# Patient Record
Sex: FEMALE | Race: WHITE
[De-identification: names, ages, dates, MRNs, and addresses within clinical notes are randomized per-mention and may not be internally consistent; named-entity substitution may affect disease eponyms.]

---

## 2017-12-02 ENCOUNTER — HOSPITAL ENCOUNTER (EMERGENCY)
Dept: HOSPITAL 62 - ER | Age: 72
Discharge: HOME | End: 2017-12-02
Payer: MEDICARE

## 2017-12-02 VITALS — DIASTOLIC BLOOD PRESSURE: 74 MMHG | SYSTOLIC BLOOD PRESSURE: 136 MMHG

## 2017-12-02 DIAGNOSIS — R09.81: ICD-10-CM

## 2017-12-02 DIAGNOSIS — R09.89: ICD-10-CM

## 2017-12-02 DIAGNOSIS — R05: Primary | ICD-10-CM

## 2017-12-02 DIAGNOSIS — R49.0: ICD-10-CM

## 2017-12-02 LAB
ALBUMIN SERPL-MCNC: 4.4 G/DL (ref 3.5–5)
ALP SERPL-CCNC: 77 U/L (ref 38–126)
ALT SERPL-CCNC: 39 U/L (ref 9–52)
ANION GAP SERPL CALC-SCNC: 15 MMOL/L (ref 5–19)
AST SERPL-CCNC: 25 U/L (ref 14–36)
BASOPHILS # BLD AUTO: 0 10^3/UL (ref 0–0.2)
BASOPHILS NFR BLD AUTO: 0.5 % (ref 0–2)
BILIRUB DIRECT SERPL-MCNC: 0.3 MG/DL (ref 0–0.4)
BILIRUB SERPL-MCNC: 0.4 MG/DL (ref 0.2–1.3)
BUN SERPL-MCNC: 12 MG/DL (ref 7–20)
CALCIUM: 9.5 MG/DL (ref 8.4–10.2)
CHLORIDE SERPL-SCNC: 104 MMOL/L (ref 98–107)
CO2 SERPL-SCNC: 23 MMOL/L (ref 22–30)
CREAT SERPL-MCNC: 0.91 MG/DL (ref 0.52–1.25)
EOSINOPHIL # BLD AUTO: 0.1 10^3/UL (ref 0–0.6)
EOSINOPHIL NFR BLD AUTO: 2.9 % (ref 0–6)
ERYTHROCYTE [DISTWIDTH] IN BLOOD BY AUTOMATED COUNT: 12.9 % (ref 11.5–14)
GLUCOSE SERPL-MCNC: 94 MG/DL (ref 75–110)
HCT VFR BLD CALC: 44.1 % (ref 36–47)
HGB BLD-MCNC: 14.9 G/DL (ref 12–15.5)
HGB HCT DIFFERENCE: 0.6
LYMPHOCYTES # BLD AUTO: 1.8 10^3/UL (ref 0.5–4.7)
LYMPHOCYTES NFR BLD AUTO: 39.9 % (ref 13–45)
MCH RBC QN AUTO: 33.1 PG (ref 27–33.4)
MCHC RBC AUTO-ENTMCNC: 33.9 G/DL (ref 32–36)
MCV RBC AUTO: 98 FL (ref 80–97)
MONOCYTES # BLD AUTO: 0.6 10^3/UL (ref 0.1–1.4)
MONOCYTES NFR BLD AUTO: 13 % (ref 3–13)
NEUTROPHILS # BLD AUTO: 2 10^3/UL (ref 1.7–8.2)
NEUTS SEG NFR BLD AUTO: 43.7 % (ref 42–78)
POTASSIUM SERPL-SCNC: 4.7 MMOL/L (ref 3.6–5)
PROT SERPL-MCNC: 7.4 G/DL (ref 6.3–8.2)
RBC # BLD AUTO: 4.52 10^6/UL (ref 3.72–5.28)
SODIUM SERPL-SCNC: 141.7 MMOL/L (ref 137–145)
WBC # BLD AUTO: 4.5 10^3/UL (ref 4–10.5)

## 2017-12-02 PROCEDURE — 71020: CPT

## 2017-12-02 PROCEDURE — 99283 EMERGENCY DEPT VISIT LOW MDM: CPT

## 2017-12-02 PROCEDURE — 87804 INFLUENZA ASSAY W/OPTIC: CPT

## 2017-12-02 PROCEDURE — 36415 COLL VENOUS BLD VENIPUNCTURE: CPT

## 2017-12-02 PROCEDURE — 80053 COMPREHEN METABOLIC PANEL: CPT

## 2017-12-02 PROCEDURE — 85025 COMPLETE CBC W/AUTO DIFF WBC: CPT

## 2017-12-02 NOTE — ER DOCUMENT REPORT
ED Medical Screen (RME)





- General


Chief Complaint: Productive Cough


Stated Complaint: COUGH


Time Seen by Provider: 12/02/17 16:38


Mode of Arrival: Ambulatory


Information source: Patient


TRAVEL OUTSIDE OF THE [place] IN LAST 30 DAYS: No





- HPI


Patient complains to provider of: cough


Onset: Other - pt states she has had recurrent productive cough for the past 

few weeks despite being placed on abx and steroids by her PCP.  Has had low 

grade fever as well





- Related Data


Allergies/Adverse Reactions: 


 





No Known Drug Allergies Allergy (Verified 12/02/17 15:59)


 











Past Medical History





- Social History


Chew tobacco use (# tins/day): No


Frequency of alcohol use: None


Drug Abuse: None





- Past Medical History


Cardiac Medical History: 


   Denies: Hx Coronary Artery Disease, Hx Heart Attack, Hx Hypertension


Pulmonary Medical History: 


   Denies: Hx Asthma, Hx Bronchitis, Hx COPD, Hx Pneumonia


Neurological Medical History: Denies: Hx Cerebrovascular Accident, Hx Seizures


Renal/ Medical History: Denies: Hx Peritoneal Dialysis


GI Medical History: Denies: Hx Hepatitis, Hx Hiatal Hernia, Hx Ulcer


Musculoskeltal Medical History: Reports Hx Arthritis


Infectious Medical History: Denies: Hx Hepatitis


Past Surgical History: Reports: Hx Hysterectomy.  Denies: Hx Mastectomy, Hx 

Open Heart Surgery, Hx Pacemaker





- Immunizations


Hx Diphtheria, Pertussis, Tetanus Vaccination: Yes





Physical Exam





- Vital signs


Vitals: 





 











Temp Pulse Resp BP Pulse Ox


 


 98.2 F   81   18   139/76 H  97 


 


 12/02/17 16:19  12/02/17 16:19  12/02/17 16:19  12/02/17 16:19  12/02/17 16:19














Course





- Vital Signs


Vital signs: 





 











Temp Pulse Resp BP Pulse Ox


 


 98.2 F   81   18   139/76 H  97 


 


 12/02/17 16:19  12/02/17 16:19  12/02/17 16:19  12/02/17 16:19  12/02/17 16:19

## 2017-12-02 NOTE — ER DOCUMENT REPORT
ED General





- General


Chief Complaint: Productive Cough


Stated Complaint: COUGH


Time Seen by Provider: 12/02/17 16:38


Mode of Arrival: Ambulatory


Notes: 


Patient is a 72-year-old female comes emergency department for chief complaint 

of productive cough with yellow sputum production for the past several days, 

she also does have some sinus congestion and voice hoarseness.   states 

that she sounded like she was wheezing at night.  Patient does not smoke, has 

no history of COPD or asthma, wife states that she has had recurrent episodes 

of the same since August.  She was treated with azithromycin twice before and 

had "shots of steroids".  Patient is typically very healthy, she takes no daily 

medications, only past medical history reported is arthritis and hysterectomy.





TRAVEL OUTSIDE OF THE [place] IN LAST 30 DAYS: No





- Related Data


Allergies/Adverse Reactions: 


 





No Known Drug Allergies Allergy (Verified 12/02/17 15:59)


 











Past Medical History





- General


Information source: Patient





- Social History


Smoking Status: Never Smoker


Chew tobacco use (# tins/day): No


Frequency of alcohol use: None


Drug Abuse: None


Family History: Reviewed & Not Pertinent


Patient has suicidal ideation: No


Patient has homicidal ideation: No





- Past Medical History


Cardiac Medical History: 


   Denies: Hx Coronary Artery Disease, Hx Heart Attack, Hx Hypertension


Pulmonary Medical History: 


   Denies: Hx Asthma, Hx Bronchitis, Hx COPD, Hx Pneumonia


Neurological Medical History: Denies: Hx Cerebrovascular Accident, Hx Seizures


Renal/ Medical History: Denies: Hx Peritoneal Dialysis


GI Medical History: Denies: Hx Hepatitis, Hx Hiatal Hernia, Hx Ulcer


Musculoskeltal Medical History: Reports Hx Arthritis


Infectious Medical History: Denies: Hx Hepatitis


Past Surgical History: Reports: Hx Hysterectomy.  Denies: Hx Mastectomy, Hx 

Open Heart Surgery, Hx Pacemaker





- Immunizations


Hx Diphtheria, Pertussis, Tetanus Vaccination: Yes





Review of Systems





- Review of Systems


Constitutional: See HPI


EENT: No symptoms reported


Cardiovascular: No symptoms reported


Respiratory: See HPI


Gastrointestinal: No symptoms reported


Genitourinary: No symptoms reported


Female Genitourinary: No symptoms reported


Musculoskeletal: No symptoms reported


Skin: No symptoms reported


Hematologic/Lymphatic: No symptoms reported


Neurological/Psychological: No symptoms reported





Physical Exam





- Vital signs


Vitals: 


 











Temp Pulse Resp BP Pulse Ox


 


 98.2 F   81   18   139/76 H  97 


 


 12/02/17 16:19  12/02/17 16:19  12/02/17 16:19  12/02/17 16:19  12/02/17 16:19











Interpretation: Normal





- General


General appearance: Appears well, Alert


In distress: None





- HEENT


Head: Normocephalic, Atraumatic


Eyes: Normal


Pupils: PERRL





- Respiratory


Respiratory status: No respiratory distress.  No: Respiratory distress, 

Retractions, Tachypnea


Chest status: Nontender


Breath sounds: Nonproductive cough, Other - Scattered coarse lung sounds in the 

right lung only, no wheezing, rales, rhonchi, normal lung auscultation otherwise


Chest palpation: Normal





- Cardiovascular


Rhythm: Regular.  No: Tachycardia


Heart sounds: Normal auscultation, S1 appreciated, S2 appreciated


Murmur: No





- Abdominal


Inspection: Normal


Distension: No distension


Bowel sounds: Normal


Tenderness: Nontender


Organomegaly: No organomegaly





- Back


Back: Normal, Nontender





- Extremities


General upper extremity: Normal inspection, Nontender, Normal strength, Normal 

temperature


General lower extremity: Normal inspection, Nontender, Normal strength, Normal 

temperature





- Neurological


Neuro grossly intact: Yes


Cognition: Normal


Orientation: AAOx4


David Coma Scale Eye Opening: Spontaneous


David Coma Scale Verbal: Oriented


Shania Coma Scale Motor: Obeys Commands


Shania Coma Scale Total: 15


Speech: Normal


Motor strength normal: LUE, RUE, LLE, RLE


Sensory: Normal





- Psychological


Associated symptoms: Normal affect, Normal mood





- Skin


Skin Temperature: Warm


Skin Moisture: Dry


Skin Color: Normal





Course





- Re-evaluation


Re-evalutation: 


Patient does have some coarse breath sounds in the right lung compared to left, 

clear lungs otherwise, no hypoxia, tachypnea, or signs of distress.  Chest x-

ray unremarkable, CBC, chemistry, influenza are unremarkable.  Patient has a 

productive cough.  She most likely has bronchitis, however because of slightly 

abnormal lung sounds to be covered with both prednisone and doxycycline.  This 

was decided after discussion with patient.  Patient is to follow-up closely 

with her primary provider, she is to return immediately if she develops any 

concerning or worsening symptoms including difficulty breathing, spiking fever, 

etc.  Patient and  state understanding and agreement with plan.





- Vital Signs


Vital signs: 


 











Temp Pulse Resp BP Pulse Ox


 


 98.2 F   76   18   136/74 H  100 


 


 12/02/17 16:19  12/02/17 21:00  12/02/17 21:00  12/02/17 21:00  12/02/17 21:00














- Laboratory


Result Diagrams: 


 12/02/17 17:08





 12/02/17 17:08


Laboratory results interpreted by me: 


 











  12/02/17





  17:08


 


MCV  98 H














Discharge





- Discharge


Clinical Impression: 


 Productive cough, Sinus congestion





Condition: Stable


Disposition: HOME, SELF-CARE


Additional Instructions: 


Your symptoms and examination are consistent with bronchitis.  Chest x-ray, 

influenza test, and lab work did not show any positives or concerning 

abnormalities.  





Recommendation is to take the doxycycline and prednisone as prescribed, take 

the Tessalon if needed for cough, drink plenty of fluids.  I also recommend 

considering over-the-counter antiallergy medication on a daily basis such as 

Claritin or Zyrtec because of suspected allergic component.  





Follow-up with primary care.  Return the emergency department for any 

concerning or worsening symptoms including difficulty breathing, spiking fever, 

or any other concerning symptoms.


Prescriptions: 


Benzonatate [Tessalon Perle 100 mg Capsule] 100 mg PO Q8HP PRN #20 cap


 PRN Reason: 


Doxycycline Hyclate 100 mg PO BID #14 capsule


Prednisone [Deltasone 10 mg Tablet] 10 mg PO ASDIR PRN #21 tablet


 PRN Reason: 


Referrals: 


ROBERTA THAKKAR FNP-C [Primary Care Provider] - Follow up in 1 week

## 2017-12-02 NOTE — RADIOLOGY REPORT (SQ)
EXAM DESCRIPTION:  CHEST PA/LAT



COMPLETED DATE/TIME:  12/2/2017 5:26 pm



REASON FOR STUDY:  productive cough



COMPARISON:  2/12/2013



EXAM PARAMETERS:  NUMBER OF VIEWS: two views

TECHNIQUE: Digital Frontal and Lateral radiographic views of the chest acquired.

RADIATION DOSE: NA

LIMITATIONS: none



FINDINGS:  LUNGS AND PLEURA: No opacities, masses or pneumothorax. No pleural effusion.

MEDIASTINUM AND HILAR STRUCTURES: No masses or contour abnormalities.

HEART AND VASCULAR STRUCTURES: Heart normal size.  No evidence for failure.

BONES: No acute findings.

HARDWARE: None in the chest.

OTHER: No other significant finding.



IMPRESSION:  NO SIGNIFICANT RADIOGRAPHIC FINDING IN THE CHEST.



TECHNICAL DOCUMENTATION:  JOB ID:  [id number]

 2011 [employer]- All Rights Reserved

## 2020-11-15 ENCOUNTER — HOSPITAL ENCOUNTER (EMERGENCY)
Dept: HOSPITAL 62 - ER | Age: 75
LOS: 1 days | Discharge: ANOTHER HOSPITAL | End: 2020-11-16
Payer: MEDICARE

## 2020-11-15 DIAGNOSIS — Z90.710: ICD-10-CM

## 2020-11-15 DIAGNOSIS — N10: ICD-10-CM

## 2020-11-15 DIAGNOSIS — Z20.828: ICD-10-CM

## 2020-11-15 DIAGNOSIS — A41.9: Primary | ICD-10-CM

## 2020-11-15 DIAGNOSIS — R10.9: ICD-10-CM

## 2020-11-15 DIAGNOSIS — N20.1: ICD-10-CM

## 2020-11-15 DIAGNOSIS — R11.2: ICD-10-CM

## 2020-11-15 LAB
ABSOLUTE LYMPHOCYTES# (MANUAL): 0.4 10^3/UL (ref 0.5–4.7)
ABSOLUTE MONOCYTES # (MANUAL): 0.1 10^3/UL (ref 0.1–1.4)
ADD MANUAL DIFF: YES
ALBUMIN SERPL-MCNC: 4.8 G/DL (ref 3.5–5)
ALP SERPL-CCNC: 122 U/L (ref 38–126)
ANION GAP SERPL CALC-SCNC: 17 MMOL/L (ref 5–19)
AST SERPL-CCNC: 46 U/L (ref 14–36)
BASOPHILS NFR BLD MANUAL: 2 % (ref 0–2)
BILIRUB DIRECT SERPL-MCNC: 0.2 MG/DL (ref 0–0.4)
BILIRUB SERPL-MCNC: 1.3 MG/DL (ref 0.2–1.3)
BUN SERPL-MCNC: 15 MG/DL (ref 7–20)
CALCIUM: 9.8 MG/DL (ref 8.4–10.2)
CHLORIDE SERPL-SCNC: 103 MMOL/L (ref 98–107)
CO2 SERPL-SCNC: 21 MMOL/L (ref 22–30)
DACRYOCYTES BLD QL SMEAR: SLIGHT
EOSINOPHIL NFR BLD MANUAL: 0 % (ref 0–6)
ERYTHROCYTE [DISTWIDTH] IN BLOOD BY AUTOMATED COUNT: 13.3 % (ref 11.5–14)
GLUCOSE SERPL-MCNC: 169 MG/DL (ref 75–110)
HCT VFR BLD CALC: 44.6 % (ref 36–47)
HGB BLD-MCNC: 15 G/DL (ref 12–15.5)
MACROCYTES BLD QL SMEAR: SLIGHT
MCH RBC QN AUTO: 33.6 PG (ref 27–33.4)
MCHC RBC AUTO-ENTMCNC: 33.6 G/DL (ref 32–36)
MCV RBC AUTO: 100 FL (ref 80–97)
MONOCYTES % (MANUAL): 6 % (ref 3–13)
NRBC BLD AUTO-RTO: 6 /100 WBC
PLATELET # BLD: 155 10^3/UL (ref 150–450)
PLATELET CLUMP BLD QL SMEAR: PRESENT
PLATELET COMMENT: ADEQUATE
POIKILOCYTOSIS BLD QL SMEAR: SLIGHT
POTASSIUM SERPL-SCNC: 4.1 MMOL/L (ref 3.6–5)
PROT SERPL-MCNC: 8.4 G/DL (ref 6.3–8.2)
RBC # BLD AUTO: 4.46 10^6/UL (ref 3.72–5.28)
SEGMENTED NEUTROPHILS % (MAN): 65 % (ref 42–78)
TOTAL CELLS COUNTED BLD: 51
VARIANT LYMPHS NFR BLD MANUAL: 27 % (ref 13–45)
WBC # BLD AUTO: 1.5 10^3/UL (ref 4–10.5)

## 2020-11-15 PROCEDURE — 83690 ASSAY OF LIPASE: CPT

## 2020-11-15 PROCEDURE — 87150 DNA/RNA AMPLIFIED PROBE: CPT

## 2020-11-15 PROCEDURE — S0119 ONDANSETRON 4 MG: HCPCS

## 2020-11-15 PROCEDURE — 87088 URINE BACTERIA CULTURE: CPT

## 2020-11-15 PROCEDURE — 96361 HYDRATE IV INFUSION ADD-ON: CPT

## 2020-11-15 PROCEDURE — 85025 COMPLETE CBC W/AUTO DIFF WBC: CPT

## 2020-11-15 PROCEDURE — C9803 HOPD COVID-19 SPEC COLLECT: HCPCS

## 2020-11-15 PROCEDURE — 96375 TX/PRO/DX INJ NEW DRUG ADDON: CPT

## 2020-11-15 PROCEDURE — 36415 COLL VENOUS BLD VENIPUNCTURE: CPT

## 2020-11-15 PROCEDURE — 87086 URINE CULTURE/COLONY COUNT: CPT

## 2020-11-15 PROCEDURE — 99285 EMERGENCY DEPT VISIT HI MDM: CPT

## 2020-11-15 PROCEDURE — 87635 SARS-COV-2 COVID-19 AMP PRB: CPT

## 2020-11-15 PROCEDURE — 87040 BLOOD CULTURE FOR BACTERIA: CPT

## 2020-11-15 PROCEDURE — 81001 URINALYSIS AUTO W/SCOPE: CPT

## 2020-11-15 PROCEDURE — 87077 CULTURE AEROBIC IDENTIFY: CPT

## 2020-11-15 PROCEDURE — 74177 CT ABD & PELVIS W/CONTRAST: CPT

## 2020-11-15 PROCEDURE — 87186 SC STD MICRODIL/AGAR DIL: CPT

## 2020-11-15 PROCEDURE — 96365 THER/PROPH/DIAG IV INF INIT: CPT

## 2020-11-15 PROCEDURE — 80053 COMPREHEN METABOLIC PANEL: CPT

## 2020-11-15 NOTE — ER DOCUMENT REPORT
ED Medical Screen (RME)





- General


Chief Complaint: Abdominal Pain


Stated Complaint: ABDOMINAL PAIN,VOMITING


Time Seen by Provider: 11/15/20 22:13


Primary Care Provider: 


ROBERTA THAKKAR FNP-C [Primary Care Provider] - Follow up as needed


Mode of Arrival: Wheelchair


Information source: Patient


Notes: 





HPI; 75-year-old female presents to the emergency room complaining of sudden 

onset of nausea, vomiting, abdominal pain that started around 4 PM today.  

Denies any fevers.  No urinary symptoms.  States he tried drinking ginger ale 

without relief.  Patient is actively vomiting in the lobby.





PE: She is alert and oriented x3.  Moderate distress noted.  Lungs: Clear to 

auscultation without rales, rhonchi, wheezes.  Heart regular rate rhythm without

murmurs, rubs, gallops plan








I have greeted and performed a rapid initial assessment of this patient.  A 

comprehensive ED assessment and evaluation of the patient, analysis of test 

results and completion of the medical decision making process will be conducted 

by additional ED providers.  I have specifically instructed the patient or 

family members with the patient to immediately return to any nursing staff shoul

d anything change in the patient's condition or with their chief complaint.


TRAVEL OUTSIDE OF THE [place] IN LAST 30 DAYS: No





- Related Data


Allergies/Adverse Reactions: 


                                        





No Known Drug Allergies Allergy (Verified 12/02/17 15:59)


   











Past Medical History





- Past Medical History


Cardiac Medical History: 


   Denies: Hx Coronary Artery Disease, Hx Heart Attack, Hx Hypertension


Pulmonary Medical History: 


   Denies: Hx Asthma, Hx Bronchitis, Hx COPD, Hx Pneumonia


Neurological Medical History: Denies: Hx Cerebrovascular Accident, Hx Seizures


Renal/ Medical History: Denies: Hx Peritoneal Dialysis


GI Medical History: Denies: Hx Hepatitis, Hx Hiatal Hernia, Hx Ulcer


Musculoskeltal Medical History: Reports Hx Arthritis


Infectious Medical History: Denies: Hx Hepatitis


Past Surgical History: Reports: Hx Hysterectomy.  Denies: Hx Mastectomy, Hx Open

Heart Surgery, Hx Pacemaker





- Immunizations


Hx Diphtheria, Pertussis, Tetanus Vaccination: Yes





Physical Exam





- Vital signs


Vitals: 





                                        











Temp Pulse Resp BP Pulse Ox


 


 97.8 F   78   18   141/88 H  97 


 


 11/15/20 21:59  11/15/20 21:59  11/15/20 21:59  11/15/20 21:59  11/15/20 21:59














Course





- Vital Signs


Vital signs: 





                                        











Temp Pulse Resp BP Pulse Ox


 


 97.8 F   78   18   141/88 H  97 


 


 11/15/20 21:59  11/15/20 21:59  11/15/20 21:59  11/15/20 21:59  11/15/20 21:59














Doctor's Discharge





- Discharge


Referrals: 


ROBERTA THAKKAR, FNP-C [Primary Care Provider] - Follow up as needed

## 2020-11-16 VITALS — SYSTOLIC BLOOD PRESSURE: 102 MMHG | DIASTOLIC BLOOD PRESSURE: 62 MMHG

## 2020-11-16 LAB
APPEARANCE UR: (no result)
APTT PPP: YELLOW S
BILIRUB UR QL STRIP: NEGATIVE
GLUCOSE UR STRIP-MCNC: NEGATIVE MG/DL
KETONES UR STRIP-MCNC: NEGATIVE MG/DL
NITRITE UR QL STRIP: POSITIVE
PATH REV BLD -IMP: (no result)
PH UR STRIP: 6 [PH] (ref 5–9)
PROT UR STRIP-MCNC: 30 MG/DL
SP GR UR STRIP: 1.03
UROBILINOGEN UR-MCNC: NEGATIVE MG/DL (ref ?–2)

## 2020-11-16 NOTE — ER DOCUMENT REPORT
ED General





- General


Mode of Arrival: Wheelchair


TRAVEL OUTSIDE OF THE [place] IN LAST 30 DAYS: No





- Related Data


Home Medications: Zyrtec, Benadryl





<JESS HERNANDEZ - Last Filed: 11/16/20 03:39>





<SPRING HERNÁNDEZ - Last Filed: 11/16/20 08:05>





- General


Chief Complaint: Abdominal Pain


Stated Complaint: ABDOMINAL PAIN,VOMITING


Time Seen by Provider: 11/15/20 22:13


Primary Care Provider: 


ROBERTA THAKKAR FNP-C [COMMUNITY BASED STAFF] - Follow up as needed





- [facility]


Notes: 





Patient is a 75-year-old female who presents emergency department for evaluation

of sudden onset abdominal pain, nausea, vomiting.  She  had multiple episodes of

nonbloody emesis.  She states it is dark green.  She denies any hematemesis.  No

fevers or chills.  She has a chronic cough, which she states is no worse than 

normal.  She had a normal bowel movement yesterday.  No hematuria, urinary 

frequency, dysuria noted.  Her pain is located in her lower abdomen without 

radiation.  She really cannot describe it for me.  She states it was 

excruciating earlier, cannot describe it further.  She states that at this time 

it has improved. (JESS HERNANDEZ)





- Related Data


Allergies/Adverse Reactions: 


                                        





No Known Drug Allergies Allergy (Verified 12/02/17 15:59)


   











Past Medical History





- General


Information source: Patient





- Social History


Smoking Status: Never Smoker


Family History: Reviewed & Not Pertinent





- Past Medical History


Cardiac Medical History: 


   Denies: Hx Coronary Artery Disease, Hx Heart Attack, Hx Hypertension


Pulmonary Medical History: Reports: Hx Respiratory Failure - Secondary to 

sepsis, Other - Chronic cough secondary to scarring, history of intubation


   Denies: Hx Asthma, Hx Bronchitis, Hx COPD, Hx Pneumonia


Neurological Medical History: Denies: Hx Cerebrovascular Accident, Hx Seizures


Renal/ Medical History: Reports: Hx Kidney Stones.  Denies: Hx Peritoneal 

Dialysis


GI Medical History: Denies: Hx Hepatitis, Hx Hiatal Hernia, Hx Ulcer


Musculoskeletal Medical History: Reports Hx Arthritis


Infectious Medical History: Denies: Hx Hepatitis


Past Surgical History: Reports: Hx Hysterectomy, Other - Bladder surgery.  

Denies: Hx Mastectomy, Hx Open Heart Surgery, Hx Pacemaker





- Immunizations


Hx Diphtheria, Pertussis, Tetanus Vaccination: Yes





<JESS HERNANDEZ - Last Filed: 11/16/20 03:39>





Review of Systems





- Review of Systems


Constitutional: No symptoms reported


EENT: No symptoms reported


Cardiovascular: No symptoms reported


Respiratory: See HPI


Gastrointestinal: See HPI


Genitourinary: No symptoms reported


Female Genitourinary: No symptoms reported


Musculoskeletal: No symptoms reported


Skin: No symptoms reported


Neurological/Psychological: No symptoms reported





<JESS HERNANDEZ - Last Filed: 11/16/20 03:39>





Physical Exam





<JESS HERNANDEZ - Last Filed: 11/16/20 03:39>





- Vital signs


Vitals: 


                                        











Temp Pulse Resp BP Pulse Ox


 


 97.8 F   78   18   141/88 H  97 


 


 11/15/20 21:59  11/15/20 21:59  11/15/20 21:59  11/15/20 21:59  11/15/20 21:59














- Notes


Notes: 





Vital signs reviewed, please refer to chart. Head is normocephalic, atraumatic. 

Pupils equal round, reactive to light.  Neck is supple without meningismus.  

Heart is regular rate and rhythm.  Lungs are clear to auscultation bilaterally. 

Abdomen is soft, mildly tender globally without rebound or guarding, normoactive

bowel sounds throughout.  Extremities without cyanosis, clubbing. Posterior 

calves are nontender.  Peripheral pulses are equal.  Skin is warm and dry.  

Patient is awake, alert, neurological exam is nonfocal. (JESS HERNANDEZ)





Course





- Laboratory


Result Diagrams: 


                                 11/15/20 22:52





                                 11/15/20 22:52





- Diagnostic Test


Radiology reviewed: Image reviewed, Reports reviewed





<JESS HERNANDEZ - Last Filed: 11/16/20 03:39>





- Laboratory


Result Diagrams: 


                                 11/15/20 22:52





                                 11/15/20 22:52





<SPRING HERNÁNDEZ - Last Filed: 11/16/20 08:05>





- Re-evaluation


Re-evalutation: 





11/16/20 01:28


Patient presents emergency department for evaluation.  She was initially seen 

through triage and had laboratory investigations and imaging as ordered.  She 

has been remarkably leukopenic.  I am awaiting CT scan results.  I will treat 

her symptoms and continue to monitor.


11/16/20 02:56


Patient CT scan shows a 5 mm kidney stone.  Her urine is nitrite positive.  

Given her low white count, I am concerned about sepsis in this patient.  I have 

discussed this with the patient.  She has seen Dr. Freeman, urology at [facility] in

the past.  In fact she was transferred there when she was uroseptic in the past.

 I have  a call out to [facility] for further evaluation.  Patient given IV 

Rocephin.  Sepsis fluids.  She is currently improved after symptomatic 

medications.


11/16/20 03:15


I spoke with Dr. John, on-call urologist at [facility].  He agrees that the 

patient may in fact need stented, certainly with the sepsis, however, he thinks 

that this patient would be best served by being admitted by the hospitalist.  He

will call the nursing administrator to know the plan.  Awaiting phone call from 

hospitalist.


11/16/20 03:32


Rapid Covid test has been ordered, is currently pending.  I spoke with Dr. Urbina.  He will accept the patient, checking bed status.


11/16/20 03:39


I spoke with the nursing supervisor at [facility].  She informs me that Dr. Weeks would like an [facility] level of care bed, which they do not have at this 

time.  He states that it might happen later this afternoon, but wanted me to be 

notified in case any other arrangements should be made.  The patient remains 

currently stable with a normal map, but her blood pressure is slightly lower 

than it was.  I do believe is reasonable to try another facility.  I have a ph

one call out to [facility] at this time. (JESS HERNANDEZ)





11/16/20 08:04


resting in bed, alert.


11/16/20 08:04


Medically cleared for transfer to receiving hospital. (SPRING HERNÁNDEZ)





- Vital Signs


Vital signs: 


                                        











Temp Pulse Resp BP Pulse Ox


 


 98.9 F   107 H  28 H  102/62   96 


 


 11/16/20 07:24  11/16/20 01:44  11/16/20 07:24  11/16/20 07:24  11/16/20 07:24














11/16/20 08:03


vital sounds stable except for mild tachycardia. (SPRING HERNÁNDEZ)





- Laboratory


Laboratory results interpreted by me: 


                                        











  11/15/20 11/15/20 11/16/20





  22:52 22:52 02:05


 


WBC  1.5 L*  


 


MCV  100 H  


 


MCH  33.6 H  


 


Abs Neuts (Manual)  1.0 L  


 


Abs Lymphs (Manual)  0.4 L  


 


Carbon Dioxide   21 L 


 


Est GFR ( Amer)   55 L 


 


Est GFR (MDRD) Non-Af   45 L 


 


Glucose   169 H 


 


AST   46 H 


 


ALT   38 H 


 


Total Protein   8.4 H 


 


Urine Protein    30 H


 


Urine Blood    LARGE H


 


Urine Nitrite    POSITIVE H


 


Ur Leukocyte Esterase    SMALL H














- Diagnostic Test


Radiology results interpreted by me: 





11/16/20 02:57





                                        





Abdomen/Pelvis CT  11/15/20 22:18


IMPRESSION: There are bilateral extrarenal pelvises present.


There is moderate left hydroureteronephrosis with urothelial


thickening and enhancement present secondary to a 5 mm calculus


at the midportion of the left ureter. There is stranding around


the left kidney. Superimposed infection is not excluded. With a


urothelial thickening and enhancement, this could represent


pyelitis.


 


There are punctate calcifications within the dependent portion of


the urinary bladder.


 


Hepatic steatosis


 


There are subpleural groundglass changes within the lung bases


which may reflect dependent atelectasis, less likely infection.


 








 (JESS HERNANDEZ)





Discharge





- Discharge


Admitting Provider: Carlitos





<JESS HERNANDEZ - Last Filed: 11/16/20 03:39>





<SPRING HERNÁNDEZ - Last Filed: 11/16/20 08:05>





- Discharge


Clinical Impression: 


 Acute pyelitis, Left ureteral stone





Sepsis


Qualifiers:


 Sepsis type: sepsis due to unspecified organism Severe sepsis acute organ 

dysfunction type: unspecified Severe sepsis shock status: without septic shock 





Condition: Stable


Disposition: [facility]


Referrals: 


ROBERTA THAKKAR, KERRYC [COMMUNITY BASED STAFF] - Follow up as needed

## 2020-11-16 NOTE — RADIOLOGY REPORT (SQ)
CT ABDOMEN AND PELVIS WITH INTRAVENOUS CONTRAST: 11/16/2020 12:38

AM CST



HISTORY: 75-year old with abdominal pain.



COMPARISON: CT of abdomen and pelvis from 10/16/2015



TECHNIQUE: Axial contiguous images were obtained from the lung

bases to the proximal femurs with intravenous intravenous

contrast administered. Sagittal and coronal reconstructions were

also obtained and reviewed. This exam was performed according to

our departmental dose-optimization program, which includes

automated exposure control, adjustment of the mA and/or KV

according to the patient's size and/or use of iterative

reconstruction technique.



FINDINGS:  There is some subpleural groundglass change, most

likely representing atelectasis, less likely infection.  No

discrete pleural effusions are seen. There is a moderate hiatal

hernia containing portions of the stomach.



The visualized hepatic parenchyma is diffusely low in

attenuation. No focal enhancing lesion is seen. 



Cholelithiasis is seen. No significant gallbladder wall

thickening is seen. 



The spleen is normal in size. The pancreas is unremarkable. 



The bilateral adrenal glands appear unremarkable.



There are bilateral extrarenal pelvises present. There is

moderate left hydroureteronephrosis with urothelial thickening

and enhancement present secondary to a 5 mm calculus at the

midportion of the left ureter. There is stranding around the left

kidney. Superimposed infection is not excluded. There is also

overall decreased enhancement of the left kidney in comparison to

the right. No right renal or ureteral calculi are seen. There are

punctate calcifications seen at the dependent portion of the

urinary bladder at the midline.



The stomach is not well distended. 



The small bowel loops appear unremarkable. No pericolonic

inflammatory stranding is seen. The appendix appears

unremarkable.



There is no evidence of pneumoperitoneum or free fluid. 



The aorta and IVC appear normal in size. 



No significantly enlarged lymph nodes are seen in the abdomen or

pelvis. 



Review of the bone show no evidence of any suspicious lytic or

blastic lesions. Multilevel degenerative changes are seen within

the lumbar spine.



IMPRESSION: There are bilateral extrarenal pelvises present.

There is moderate left hydroureteronephrosis with urothelial

thickening and enhancement present secondary to a 5 mm calculus

at the midportion of the left ureter. There is stranding around

the left kidney. Superimposed infection is not excluded. With a

urothelial thickening and enhancement, this could represent

pyelitis.



There are punctate calcifications within the dependent portion of

the urinary bladder.



Hepatic steatosis



There are subpleural groundglass changes within the lung bases

which may reflect dependent atelectasis, less likely infection.